# Patient Record
(demographics unavailable — no encounter records)

---

## 2025-01-07 NOTE — HISTORY OF PRESENT ILLNESS
[FreeTextEntry1] : 37 yo for [employer]. Denies any complaints, reports periods are heavier over last 5 months. She stopped breastfeeding 5/2024. Desires baseline mammogram screening.

## 2025-01-07 NOTE — PHYSICAL EXAM
[Chaperone Present] : A chaperone was present in the examining room during all aspects of the physical examination [36880] : A chaperone was present during the pelvic exam. [Appropriately responsive] : appropriately responsive [Soft] : soft [No Lesions] : no lesions [Examination Of The Breasts] : a normal appearance [Breast Reconstruction Left] : breast reconstruction [No Masses] : no breast masses were palpable [Labia Majora] : normal [Labia Minora] : normal [Normal] : normal [Uterine Adnexae] : normal

## 2025-01-07 NOTE — DISCUSSION/SUMMARY
[FreeTextEntry1] : 39 yo for [employer] -f/u pap and HPV -referral given for breast imaging -referral given for [facility] -f/u 1 year or PRN